# Patient Record
Sex: FEMALE | Race: WHITE
[De-identification: names, ages, dates, MRNs, and addresses within clinical notes are randomized per-mention and may not be internally consistent; named-entity substitution may affect disease eponyms.]

---

## 2020-10-12 ENCOUNTER — HOSPITAL ENCOUNTER (EMERGENCY)
Dept: HOSPITAL 61 - ER | Age: 60
Discharge: HOME | End: 2020-10-12
Payer: COMMERCIAL

## 2020-10-12 VITALS — SYSTOLIC BLOOD PRESSURE: 139 MMHG | DIASTOLIC BLOOD PRESSURE: 63 MMHG

## 2020-10-12 VITALS — HEIGHT: 59 IN | BODY MASS INDEX: 59.07 KG/M2 | WEIGHT: 293 LBS

## 2020-10-12 DIAGNOSIS — Z88.3: ICD-10-CM

## 2020-10-12 DIAGNOSIS — R60.0: Primary | ICD-10-CM

## 2020-10-12 DIAGNOSIS — I11.0: ICD-10-CM

## 2020-10-12 DIAGNOSIS — J44.9: ICD-10-CM

## 2020-10-12 DIAGNOSIS — I50.9: ICD-10-CM

## 2020-10-12 DIAGNOSIS — E11.9: ICD-10-CM

## 2020-10-12 PROCEDURE — 93005 ELECTROCARDIOGRAM TRACING: CPT

## 2020-10-12 PROCEDURE — 99283 EMERGENCY DEPT VISIT LOW MDM: CPT

## 2020-10-12 NOTE — EKG
[facility]

              [address] [place] [address]

Test Date:    2020-10-12               Test Time:    03:37:38

Pat Name:     BIN CHAVEZ            Department:   

Patient ID:   [id number]           Room:          

Gender:       F                        Technician:   

:          1960               Requested By: RICH AHUMDAA

Order Number: [id number]           Reading MD:   Ferdinand Prince MD

                                 Measurements

Intervals                              Axis          

Rate:         74                       P:            39

IA:           188                      QRS:          10

QRSD:         74                       T:            20

QT:           382                                    

QTc:          429                                    

                           Interpretive Statements

SINUS RHYTHM

Electronically Signed On 10- 14:08:37 CDT by Ferdinand Prince MD

## 2020-10-12 NOTE — PHYS DOC
Past Medical History


Past Medical History:  Bronchitis, COPD, Diabetes-Type II, Hypertension, S

ciatica


Past Surgical History:  Appendectomy, Cholecystectomy, Tonsillectomy


Additional Past Surgical Histo:  BOWEL SX, CARPAL TUNNEL


Smoking Status:  Never Smoker


Alcohol Use:  None


Drug Use:  None





General Adult


EDM:


Chief Complaint:  UPPER EXTREMITY PAIN





HPI:


HPI:





Xochilt Nicolas is a 60-year-old female past medical history of congestive heart 

failure and COPD who presents with left upper extremity pain.  She states that 

at around 2300 she was using her left arm to talk to her son on the phone when 

she had sudden onset of 3 episodes of dull achy pain over her left tricep.  She 

denies trauma prior to onset.  She states that immediately following this she 

stood up and walked into the other room and had an episode of dyspnea, but 

states that this is a normal issue for her.  Patient affirms having bilateral 

lower extremity swelling which is chronic and unchanged.  Her son consulted a 

trusted family friend who advised her to come to the emergency department as 

complaints could be cardiac in nature.  She denies any current pain and did not 

experience any chest pain, nausea/vomiting, or diaphoresis during this event.  

She denies all other complaints.  The patient is currently taking meloxicam for 

chronic arthritis.





Review of Systems:


Review of Systems:





Constitutional: Denies fever or chills 


Eyes: Denies redness or eye pain 


HENT: Denies nasal congestion or sore throat


Respiratory: Denies cough or shortness of breath 


Cardiovascular: Denies chest pain or palpitations


GI: Denies abdominal pain, nausea, or vomiting


: Denies dysuria or hematuria


Musculoskeletal: Denies back pain or joint pain; affirms upper extremity pain


Integument: Denies rash or skin lesions 


Neurologic: Denies headache, focal weakness or sensory changes





Complete systems were reviewed and found to be within normal limits, except as 

documented in this note.





Allergies:


Allergies:





Allergies








Coded Allergies Type Severity Reaction Last Updated Verified


 


  metronidazole Allergy Intermediate  5/12/17 Yes











Physical Exam:


PE:





Constitutional: Well developed, well nourished, no acute distress, non-toxic 

appearance


HENT: Normocephalic, atraumatic


Eyes: PERRL, EOMI, conjunctiva normal, no discharge


Neck: Normal range of motion, no tenderness, supple


Lungs & Thorax:  No respiratory distress, equal chest rise and fall


Abdomen: Soft, no tenderness


Skin: Warm, dry, no erythema, no rash


Back: No tenderness, no CVA tenderness


Extremities: Trace lower extremity edema bilaterally, chronic; left upper 

extremity: Minimal reproducible tenderness over the distal tricep, slightly 

diminished extension at the shoulder joint but active range of motion otherwise 

full


Neurologic: Alert and oriented X 3, normal motor function, normal sensory 

function, no focal deficits noted


Psychologic: Affect normal, judgment normal





EKG:


EKG:


@0337 NSR at 74ms, QRS 74ms, QT/QTc 382/429ms





Course & Med Decision Making:


Course & Med Decision Making





Patient presents with left upper extremity pain as described above.  Given the 

patient's clinical presentation, strong suspicion for muscular etiology.  

However, given the patient's risk factors a screening EKG is performed with 

benign findings.  Patient is currently taking meloxicam for chronic arthritis 

pain, so she will be given Toradol 15 mg IM prior to discharge and given 

instructions to follow-up with primary care physician.





[place] Disclaimer:


Dragon Disclaimer:


This electronic medical record was generated, in whole or in part, using a voice

recognition dictation system.





Departure


Departure


Impression:  


   Primary Impression:  


   Left arm pain


   Additional Impression:  


   Peripheral edema


Disposition:  01 DC HOME SELF CARE/HOMELESS


Condition:  STABLE


Referrals:  


KATHRYN MONTERO MD (PCP)


Patient Instructions:  Muscle Strain, Easy-to-Read, Peripheral Edema





Additional Instructions:  


Please follow closely with your doctor for further management of your chronic 

leg swelling.





You may need adjustment of your medications.  





You may also use compression stockings to help with your swelling.  You also may

need to elevate your legs.











RICH AHUMADA DO             Oct 12, 2020 03:24